# Patient Record
Sex: MALE | Race: WHITE | Employment: FULL TIME | URBAN - METROPOLITAN AREA
[De-identification: names, ages, dates, MRNs, and addresses within clinical notes are randomized per-mention and may not be internally consistent; named-entity substitution may affect disease eponyms.]

---

## 2021-09-06 ENCOUNTER — HOSPITAL ENCOUNTER (EMERGENCY)
Age: 46
Discharge: HOME | End: 2021-09-06
Attending: EMERGENCY MEDICINE
Payer: COMMERCIAL

## 2021-09-06 VITALS
WEIGHT: 201.94 LBS | RESPIRATION RATE: 20 BRPM | TEMPERATURE: 97.4 F | DIASTOLIC BLOOD PRESSURE: 83 MMHG | OXYGEN SATURATION: 98 % | HEIGHT: 68 IN | SYSTOLIC BLOOD PRESSURE: 120 MMHG | BODY MASS INDEX: 30.61 KG/M2 | HEART RATE: 91 BPM

## 2021-09-06 DIAGNOSIS — L02.512 ABSCESS OF LEFT MIDDLE FINGER: Primary | ICD-10-CM

## 2021-09-06 PROCEDURE — 87070 CULTURE OTHR SPECIMN AEROBIC: CPT

## 2021-09-06 PROCEDURE — 87205 SMEAR GRAM STAIN: CPT

## 2021-09-06 PROCEDURE — 87077 CULTURE AEROBIC IDENTIFY: CPT

## 2021-09-06 PROCEDURE — 87186 SC STD MICRODIL/AGAR DIL: CPT

## 2021-09-06 PROCEDURE — 10061 I&D ABSCESS COMP/MULTIPLE: CPT

## 2021-09-06 PROCEDURE — 99283 EMERGENCY DEPT VISIT LOW MDM: CPT

## 2021-09-06 RX ORDER — CLINDAMYCIN HYDROCHLORIDE 300 MG/1
300 CAPSULE ORAL 3 TIMES DAILY
Qty: 30 CAPSULE | Refills: 0 | Status: SHIPPED | OUTPATIENT
Start: 2021-09-06 | End: 2021-09-16

## 2021-09-06 ASSESSMENT — PAIN DESCRIPTION - ONSET: ONSET: SUDDEN

## 2021-09-06 ASSESSMENT — PAIN DESCRIPTION - ORIENTATION: ORIENTATION: LEFT

## 2021-09-06 ASSESSMENT — PAIN DESCRIPTION - PAIN TYPE: TYPE: ACUTE PAIN

## 2021-09-06 ASSESSMENT — PAIN DESCRIPTION - LOCATION: LOCATION: FINGER (COMMENT WHICH ONE)

## 2021-09-06 ASSESSMENT — PAIN SCALES - GENERAL: PAINLEVEL_OUTOF10: 8

## 2021-09-06 ASSESSMENT — PAIN DESCRIPTION - FREQUENCY: FREQUENCY: CONTINUOUS

## 2021-09-06 ASSESSMENT — PAIN DESCRIPTION - DESCRIPTORS: DESCRIPTORS: THROBBING

## 2021-09-06 NOTE — ED NOTES
Discharge instructions reviewed with pt and pt denied having any questions. Discharge paperwork signed and pt discharged.         Casper Diane RN  09/06/21 7839

## 2021-09-06 NOTE — LETTER
[address] [place], [place] [id number]  Phone: [phone]               September 6, 2021    Patient: Nikolas Narvaez   YOB: 1975   Date of Visit: 9/6/2021       To Whom It May Concern:    Earline Starks was seen and treated in our emergency department on 9/6/2021. He may return to work on 8-Sept-2021.       Sincerely,       Sheyla Ernandez RN         Signature:__________________________________

## 2021-09-06 NOTE — LETTER
[address] [place], [place] [id number]  Phone: [phone]               September 6, 2021    Patient: Melony Danielle   YOB: 1975   Date of Visit: 9/6/2021       To Whom It May Concern:    Lianet Leyva was seen and treated in our emergency department on 9/6/2021. He may return to work on 9-Sept-2021.       Sincerely,       Mercedez Borja RN         Signature:__________________________________

## 2021-09-06 NOTE — ED PROVIDER NOTES
[facility]  eMERGENCY dEPARTMENT eNCOUnter      Pt Name: Lana Haas  MRN: [id number]  [place] 1975  Date of evaluation: 9/6/2021  Provider: Juanpablo Pabon MD    [address] [place] [address]       Chief Complaint   Patient presents with    Abscess     States that he has had an abscess on his left middle finger for the past few days. Denies any precipitating event         HISTORY OF PRESENT ILLNESS  (Location/Symptom, Timing/Onset, Context/Setting, Quality, Duration, Modifying Factors, Severity.)   Lana Haas is a 42 y.o. male who presents to the emergency department planing of a boil on his left middle finger. He states is been there for 4 to 5 days. No drainage. No fever. No history of injury to the area. She states she has had some areas like this on his legs recently but they went away on their own. Nursing Notes were reviewed and I agree. REVIEW OF SYSTEMS    (2-9 systems for level 4, 10 or more for level 5)     General: No fever or chills. Musculoskeletal: Some swelling in his left hand middle finger. Skin: Abscess on his left middle finger. Except as noted above the remainder of the review of systems was reviewed and negative. PAST MEDICAL HISTORY   No past medical history on file. SURGICAL HISTORY           Procedure Laterality Date    HERNIA REPAIR         CURRENT MEDICATIONS       Previous Medications    FLUTICASONE PROPIONATE (FLONASE NA)    by Nasal route       ALLERGIES     Amoxicillin and Tetracyclines & related    FAMILY HISTORY     No family history on file. No family status information on file. SOCIAL HISTORY      reports that he has never smoked. He has never used smokeless tobacco. He reports current alcohol use. He reports that he does not use drugs.     PHYSICAL EXAM    (up to 7 for level 4, 8 or more for level 5)     ED Triage Vitals   BP Temp Temp src Pulse Resp SpO2 Height Weight   -- -- -- -- -- -- -- --       General: Alert well-appearing male in no acute distress. Head: Atraumatic and normocephalic. Eyes: No conjunctival injection. Pupils equal round reactive. ENT: Eldonna Farnaz is clear. Oropharynx moist without erythema. Neck: Supple without adenopathy, nontender. Heart: Regular rate and rhythm. No murmurs or gallops noted. Lungs: Breath sounds equal bilaterally and clear. Abdomen: Soft, nondistended, nontender. Musculoskeletal: Some mild swelling of the dorsum of the left hand. Moderate swelling of the left middle finger. Range of motion of the digits is intact including intact active and passive extension. Intact superficial deep flexor tendon function. Minimal pain with range of motion. Intact distal pulses and capillary refill. Skin: Erythema of the dorsal proximal phalanx of the middle finger with a central pustule as pictured below. No drainage. Neuro: Intact motor function sensation. DIAGNOSTIC RESULTS     RADIOLOGY:   Non-plain film images such as CT, Ultrasound and MRI are read by the radiologist. Plain radiographic images are visualized and preliminarily interpreted by Alicia Henderson MD with the below findings:      Interpretation per the Radiologist below, if available at the time of this note:    No orders to display       LABS:  Labs Reviewed   CULTURE, WOUND       All other labs were within normal range or not returned as of this dictation. EMERGENCY DEPARTMENT COURSE and DIFFERENTIAL DIAGNOSIS/MDM:   Vitals:    Vitals:    09/06/21 1051   BP: 120/83   Pulse: 91   Resp: 20   Temp: 97.4 °F (36.3 °C)   TempSrc: Oral   SpO2: 98%   Weight: 201 lb 15.1 oz (91.6 kg)   Height: 5' 8\" (1.727 m)       This patient had an abscess on his left middle finger. It was drained. It was cultured. He has no significant cellulitis or lymphangitis. He is allergic to amoxicillin/penicillin and doxycycline. He will be discharged on clindamycin pending the wound culture.   He will follow up in 2 to 3 days if not improved. He will return here for worsening of symptoms or new symptoms of concern. Diagnosis and treatment plan were discussed with the patient. He understands the treatment plan and follow-up as discussed. PROCEDURES:  Incision/Drainage    Date/Time: 9/6/2021 11:04 AM  Performed by: Vlad Montana MD  Authorized by: Vlad Montana MD     Consent:     Consent obtained:  Verbal    Consent given by:  Patient    Risks discussed:  Bleeding, incomplete drainage, infection and pain  Location:     Type:  Abscess    Size:  1.5    Location:  Upper extremity    Upper extremity location:  Finger    Finger location:  L long finger  Pre-procedure details:     Skin preparation:  Hibiclens  Anesthesia (see MAR for exact dosages): Anesthesia method:  Nerve block    Block needle gauge:  25 G    Block anesthetic:  Lidocaine 1% w/o epi    Block technique:  Digital    Block injection procedure:  Anatomic landmarks identified, introduced needle, incremental injection and negative aspiration for blood    Block outcome:  Anesthesia achieved  Procedure type:     Complexity:  Simple  Procedure details:     Needle aspiration: no      Incision types:  Single straight    Incision depth:  Subcutaneous    Scalpel blade:  11    Wound management:  Probed and deloculated and irrigated with saline    Drainage:  Purulent    Drainage amount: Moderate    Wound treatment:  Wound left open    Packing materials:  None  Post-procedure details:     Patient tolerance of procedure: Tolerated well, no immediate complications          FINAL IMPRESSION      1.  Abscess of left middle finger          DISPOSITION/PLAN   DISPOSITION Decision To Discharge 09/06/2021 11:01:18 AM      PATIENT REFERRED TO:  [facility]) Pre-Services  [phone]          DISCHARGE MEDICATIONS:  New Prescriptions    CLINDAMYCIN (CLEOCIN) 300 MG CAPSULE    Take 1 capsule by mouth 3 times daily for 10 days       (Please note that portions of this note were completed with a voice recognition program.  Efforts were made to edit the dictations but occasionally words are mis-transcribed.)    Zaria Mckeon MD  Attending Emergency Physician        Juli Jorgensen MD  09/06/21 6718

## 2021-09-09 LAB
GRAM STAIN RESULT: ABNORMAL
ORGANISM: ABNORMAL
WOUND/ABSCESS: ABNORMAL